# Patient Record
Sex: FEMALE | Race: WHITE | NOT HISPANIC OR LATINO | URBAN - NONMETROPOLITAN AREA
[De-identification: names, ages, dates, MRNs, and addresses within clinical notes are randomized per-mention and may not be internally consistent; named-entity substitution may affect disease eponyms.]

---

## 2020-11-03 ENCOUNTER — HISTORICAL (OUTPATIENT)
Dept: ADMINISTRATIVE | Facility: HOSPITAL | Age: 35
End: 2020-11-03

## 2022-08-30 ENCOUNTER — OFFICE VISIT (OUTPATIENT)
Dept: OBSTETRICS AND GYNECOLOGY | Facility: CLINIC | Age: 37
End: 2022-08-30

## 2022-08-30 VITALS
DIASTOLIC BLOOD PRESSURE: 79 MMHG | HEART RATE: 88 BPM | WEIGHT: 208.13 LBS | BODY MASS INDEX: 31.54 KG/M2 | HEIGHT: 68 IN | SYSTOLIC BLOOD PRESSURE: 112 MMHG

## 2022-08-30 DIAGNOSIS — N92.0 MENORRHAGIA WITH REGULAR CYCLE: Primary | ICD-10-CM

## 2022-08-30 DIAGNOSIS — Z30.09 FAMILY PLANNING: ICD-10-CM

## 2022-08-30 PROCEDURE — 99203 OFFICE O/P NEW LOW 30 MIN: CPT | Mod: S$GLB,,, | Performed by: NURSE PRACTITIONER

## 2022-08-30 PROCEDURE — 99203 PR OFFICE/OUTPT VISIT, NEW, LEVL III, 30-44 MIN: ICD-10-PCS | Mod: S$GLB,,, | Performed by: NURSE PRACTITIONER

## 2022-08-30 RX ORDER — DROSPIRENONE 4 MG/1
4 TABLET, FILM COATED ORAL DAILY
Qty: 28 TABLET | Refills: 1 | Status: SHIPPED | OUTPATIENT
Start: 2022-08-30

## 2022-08-30 RX ORDER — SUMATRIPTAN 50 MG/1
TABLET, FILM COATED ORAL
COMMUNITY
Start: 2022-07-05

## 2022-08-30 RX ORDER — ALPRAZOLAM 2 MG/1
TABLET ORAL
COMMUNITY
Start: 2022-08-10

## 2022-08-30 RX ORDER — ESCITALOPRAM OXALATE 20 MG/1
TABLET ORAL
COMMUNITY
Start: 2022-08-10

## 2022-08-30 RX ORDER — DEXTROAMPHETAMINE SACCHARATE, AMPHETAMINE ASPARTATE, DEXTROAMPHETAMINE SULFATE AND AMPHETAMINE SULFATE 3.75; 3.75; 3.75; 3.75 MG/1; MG/1; MG/1; MG/1
TABLET ORAL
COMMUNITY
Start: 2022-08-03

## 2022-08-30 NOTE — PROGRESS NOTES
"    Subjective:       Patient ID: Radha Mills is a 37 y.o. female.    Chief Complaint:  Contraception and Establish Care      History of Present Illness   Presents for family planning and cycle control.  Patient's last menstrual period was 2022 (exact date).  NO hx of abnormal paps last one done with Dr King  Hx of migraines.      OB History          5    Para   4    Term   4            AB   1    Living   3         SAB   1    IAB        Ectopic        Multiple        Live Births   3                  Review of Systems  Review of Systems   Constitutional: Negative.    HENT: Negative.     Respiratory: Negative.     Cardiovascular: Negative.    Gastrointestinal: Negative.    Genitourinary:  Positive for dysmenorrhea, menorrhagia and menstrual problem. Negative for dyspareunia, flank pain, genital sores, pelvic pain, urgency, vaginal bleeding and vaginal dryness.   Musculoskeletal: Negative.    Hematological: Negative.    Psychiatric/Behavioral: Negative.     Breast: negative.          Objective:     Vitals:    22 1547   BP: 112/79   Pulse: 88   Weight: 94.4 kg (208 lb 2 oz)   Height: 5' 8" (1.727 m)        Physical Exam:   Constitutional: She is oriented to person, place, and time. She appears well-developed and well-nourished.    HENT:   Head: Normocephalic and atraumatic.      Cardiovascular:  Normal rate.             Pulmonary/Chest: Effort normal.        Abdominal: Soft.             Musculoskeletal: Moves all extremeties.       Neurological: She is alert and oriented to person, place, and time.    Skin: Skin is warm and dry.    Psychiatric: She has a normal mood and affect. Her behavior is normal. Judgment and thought content normal.        Assessment:     1. Menorrhagia with regular cycle    2. Family planning              Plan:       Menorrhagia with regular cycle  -     drospirenone, contraceptive, (SLYND) 4 mg (28) Tab; Take 1 tablet (4 mg total) by mouth once daily at 6am.  " Dispense: 28 tablet; Refill: 1    Family planning  -     drospirenone, contraceptive, (SLYND) 4 mg (28) Tab; Take 1 tablet (4 mg total) by mouth once daily at 6am.  Dispense: 28 tablet; Refill: 1       All methods of contraception discussed   Risks, benefits, and side effects of each discussed   Offered OCPs, mini pill, Patch, nuvaring, annovera, nexplanon, depo provera, IUD,  vaginal gel or permanent sterilization   Pt opted for POP as a trail considering mirena, but is concerned bout migraines increasing.   Declines surgical consult   Locate pap from Dr Alfredo     Follow up in about 4 weeks (around 9/27/2022).